# Patient Record
Sex: MALE | Race: WHITE | NOT HISPANIC OR LATINO | Employment: OTHER | URBAN - METROPOLITAN AREA
[De-identification: names, ages, dates, MRNs, and addresses within clinical notes are randomized per-mention and may not be internally consistent; named-entity substitution may affect disease eponyms.]

---

## 2024-09-26 ENCOUNTER — TELEPHONE (OUTPATIENT)
Dept: HEMATOLOGY/ONCOLOGY | Facility: HOSPITAL | Age: 39
End: 2024-09-26

## 2024-10-30 ENCOUNTER — EXTERNAL FACILITY CLINIC VISIT (OUTPATIENT)
Dept: HEMATOLOGY/ONCOLOGY | Facility: EXTERNAL LOCATION | Age: 39
End: 2024-10-30

## 2024-10-30 ENCOUNTER — DOCUMENTATION (OUTPATIENT)
Dept: PEDIATRIC HEMATOLOGY/ONCOLOGY | Facility: HOSPITAL | Age: 39
End: 2024-10-30

## 2024-10-30 VITALS
BODY MASS INDEX: 35.59 KG/M2 | HEART RATE: 74 BPM | WEIGHT: 240.3 LBS | DIASTOLIC BLOOD PRESSURE: 87 MMHG | TEMPERATURE: 97.2 F | HEIGHT: 69 IN | SYSTOLIC BLOOD PRESSURE: 117 MMHG

## 2024-10-30 DIAGNOSIS — D67 HEMOPHILIA B (MULTI): Primary | ICD-10-CM

## 2024-11-20 NOTE — PROGRESS NOTES
"[facility] PT Consult    Patient: Shahab Youngblood  MRN: [id number]  11/20/24    Assessment   Shahab is a 39 y.o. with PMHx Hemophilia B who was seen by Physical Therapy in clinic on 10/30/2024. Shahab states that he had a fall and resulting knee injury/bleed on 10/23/2024.    Pt states that he fell 10/23/2024 and injured his R knee. States he took a dose morning of 10/24/24, and one more dose 10/25/24. He states this his knee is sore and when asked if walking is okay he states that it is \"not horrible\".     Upon assessment, as noted below, pt has localized swelling just superior to his patella. Bruising grossly around knee. ROM of his R knee is WFL. Gait pattern is WFL.    Plan/Recommendations:  Dose per medical team orders  Knee sleeve issued for compression, support, pain relief    Subjective   Shahab states that he had a fall and resulting knee injury/bleed on 10/23/2024.      Past Medical History:   Past Medical History:   Diagnosis Date    Hemorrhage of anus and rectum 12/13/2014    Rectal bleeding    Personal history of other diseases of the respiratory system 05/17/2014    History of pharyngitis       Past Surgical History:   Past Surgical History:   Procedure Laterality Date    OTHER SURGICAL HISTORY  11/02/2013    Hernia Repair Inguinal Unilateral       Prophylaxis: No    Interim Injury History:   October 2024: R knee injury/bleed      Objective   Joint Assessment:  Left Knee: WFL  Right Knee: Localized swelling located just superior to patella. Bruising noted grossly around knee    Range of Motion:   Knee Flexion (0-135): Left WFL and Right WFL  Knee Extension (0): Left WFL and Right WFL      Mobility:  Gait: WFL    Patient/Family Education Provided: Yes. Education on use and care of knee sleeve.     Italia Figueredo, PT    "

## 2025-03-04 ENCOUNTER — TELEPHONE (OUTPATIENT)
Dept: HEMATOLOGY/ONCOLOGY | Facility: HOSPITAL | Age: 40
End: 2025-03-04
Payer: COMMERCIAL

## 2025-03-04 NOTE — TELEPHONE ENCOUNTER
HTC Nurse Note:  Leobardo called stating he forgot to call sooner but he has what sounds like a varicocele embolization scheduled for Tuesday 3/11 at 0800 at [facility] with Dr Balta Chan.  States he has benefix and just needs to know how often to dose.  I tried to explain this is short notice and will need to go to Dr Castorena for review.  I asked that he have the information faxed to us at nurse fax [phone] by the physician planning to do the procedure.  Leobardo said he knows Dr Castorena is going to want the procedure done at , but he does not want to do that.  
Please follow through with this telephone call.  
5-7x/week